# Patient Record
Sex: MALE | Race: WHITE
[De-identification: names, ages, dates, MRNs, and addresses within clinical notes are randomized per-mention and may not be internally consistent; named-entity substitution may affect disease eponyms.]

---

## 2019-06-17 ENCOUNTER — HOSPITAL ENCOUNTER (EMERGENCY)
Dept: HOSPITAL 91 - FTE | Age: 24
LOS: 1 days | Discharge: HOME | End: 2019-06-18
Payer: MEDICAID

## 2019-06-17 ENCOUNTER — HOSPITAL ENCOUNTER (EMERGENCY)
Dept: HOSPITAL 10 - FTE | Age: 24
LOS: 1 days | Discharge: HOME | End: 2019-06-18
Payer: MEDICAID

## 2019-06-17 VITALS
HEIGHT: 66 IN | HEIGHT: 66 IN | WEIGHT: 135.58 LBS | BODY MASS INDEX: 21.79 KG/M2 | WEIGHT: 135.58 LBS | BODY MASS INDEX: 21.79 KG/M2

## 2019-06-17 DIAGNOSIS — R10.32: Primary | ICD-10-CM

## 2019-06-17 LAB
ADD MAN DIFF?: NO
ADD UMIC: NO
ALANINE AMINOTRANSFERASE: 37 IU/L (ref 13–69)
ALBUMIN/GLOBULIN RATIO: 1.32
ALBUMIN: 4.9 G/DL (ref 3.3–4.9)
ALKALINE PHOSPHATASE: 104 IU/L (ref 42–121)
ANION GAP: 10 (ref 5–13)
ASPARTATE AMINO TRANSFERASE: 36 IU/L (ref 15–46)
BASOPHIL #: 0.1 10^3/UL (ref 0–0.1)
BASOPHILS %: 0.5 % (ref 0–2)
BILIRUBIN,DIRECT: 0 MG/DL (ref 0–0.2)
BILIRUBIN,TOTAL: 0.8 MG/DL (ref 0.2–1.3)
BLOOD UREA NITROGEN: 15 MG/DL (ref 7–20)
CALCIUM: 9.6 MG/DL (ref 8.4–10.2)
CARBON DIOXIDE: 29 MMOL/L (ref 21–31)
CHLORIDE: 101 MMOL/L (ref 97–110)
CREATININE: 0.87 MG/DL (ref 0.61–1.24)
EOSINOPHILS #: 0.3 10^3/UL (ref 0–0.5)
EOSINOPHILS %: 2.4 % (ref 0–7)
GLOBULIN: 3.7 G/DL (ref 1.3–3.2)
GLUCOSE: 102 MG/DL (ref 70–220)
HEMATOCRIT: 48.5 % (ref 42–52)
HEMOGLOBIN: 15.9 G/DL (ref 14–18)
IMMATURE GRANS #M: 0.03 10^3/UL (ref 0–0.03)
IMMATURE GRANS % (M): 0.3 % (ref 0–0.43)
LIPASE: 113 U/L (ref 23–300)
LYMPHOCYTES #: 3.8 10^3/UL (ref 0.8–2.9)
LYMPHOCYTES %: 36.7 % (ref 15–51)
MEAN CORPUSCULAR HEMOGLOBIN: 29.8 PG (ref 29–33)
MEAN CORPUSCULAR HGB CONC: 32.8 G/DL (ref 32–37)
MEAN CORPUSCULAR VOLUME: 90.8 FL (ref 82–101)
MEAN PLATELET VOLUME: 10.6 FL (ref 7.4–10.4)
MONOCYTE #: 0.6 10^3/UL (ref 0.3–0.9)
MONOCYTES %: 5.5 % (ref 0–11)
NEUTROPHIL #: 5.7 10^3/UL (ref 1.6–7.5)
NEUTROPHILS %: 54.6 % (ref 39–77)
NUCLEATED RED BLOOD CELLS #: 0 10^3/UL (ref 0–0)
NUCLEATED RED BLOOD CELLS%: 0 /100WBC (ref 0–0)
PLATELET COUNT: 245 10^3/UL (ref 140–415)
POTASSIUM: 4.1 MMOL/L (ref 3.5–5.1)
RED BLOOD COUNT: 5.34 10^6/UL (ref 4.7–6.1)
RED CELL DISTRIBUTION WIDTH: 12.2 % (ref 11.5–14.5)
SODIUM: 140 MMOL/L (ref 135–144)
TOTAL PROTEIN: 8.6 G/DL (ref 6.1–8.1)
UR ASCORBIC ACID: NEGATIVE MG/DL
UR BILIRUBIN (DIP): NEGATIVE MG/DL
UR BLOOD (DIP): NEGATIVE MG/DL
UR CLARITY: CLEAR
UR COLOR: YELLOW
UR GLUCOSE (DIP): NEGATIVE MG/DL
UR KETONES (DIP): (no result) MG/DL
UR LEUKOCYTE ESTERASE (DIP): NEGATIVE LEU/UL
UR NITRITE (DIP): NEGATIVE MG/DL
UR PH (DIP): 7 (ref 5–9)
UR SPECIFIC GRAVITY (DIP): 1.02 (ref 1–1.03)
UR TOTAL PROTEIN (DIP): NEGATIVE MG/DL
UR UROBILINOGEN (DIP): NEGATIVE MG/DL
WHITE BLOOD COUNT: 10.5 10^3/UL (ref 4.8–10.8)

## 2019-06-17 PROCEDURE — 80053 COMPREHEN METABOLIC PANEL: CPT

## 2019-06-17 PROCEDURE — 99285 EMERGENCY DEPT VISIT HI MDM: CPT

## 2019-06-17 PROCEDURE — 83690 ASSAY OF LIPASE: CPT

## 2019-06-17 PROCEDURE — 74177 CT ABD & PELVIS W/CONTRAST: CPT

## 2019-06-17 PROCEDURE — 36415 COLL VENOUS BLD VENIPUNCTURE: CPT

## 2019-06-17 PROCEDURE — 85025 COMPLETE CBC W/AUTO DIFF WBC: CPT

## 2019-06-17 PROCEDURE — 81003 URINALYSIS AUTO W/O SCOPE: CPT

## 2019-06-17 PROCEDURE — 76705 ECHO EXAM OF ABDOMEN: CPT

## 2019-06-17 RX ADMIN — SODIUM CHLORIDE 1 ML: 9 INJECTION, SOLUTION INTRAMUSCULAR; INTRAVENOUS; SUBCUTANEOUS at 23:12

## 2019-06-17 RX ADMIN — THIAMINE HYDROCHLORIDE 1 MLS/HR: 100 INJECTION, SOLUTION INTRAMUSCULAR; INTRAVENOUS at 21:38

## 2019-06-17 RX ADMIN — IOHEXOL 1 ML: 300 INJECTION, SOLUTION INTRAVENOUS at 23:13

## 2019-06-17 RX ADMIN — VASOPRESSIN 1 ML/S: 20 INJECTION, SOLUTION INTRAMUSCULAR; SUBCUTANEOUS at 23:13

## 2019-06-17 NOTE — ERD
ER Documentation


Chief Complaint


Chief Complaint





left lower quadrant pain w/movement denies painful urination or BM





HPI


24-year-old male with presents with a complaint of lower left quadrant abdominal


pain since Saturday morning.  Pain is made worse with movement.  Denies any 


treatments.  Denies any nausea, fevers, chills vomiting, diarrhea, constipation,


dysuria, hematuria.  Denies medical problems.





ROS


All systems reviewed and are negative except as per history of present illness.





Medications


Home Meds


Active Scripts


Hydrocodone/Acetaminophen (Norco 5-325 Tablet) 1 Each Tablet, 1 TAB PO Q6H PRN 


for PAIN, #15 TAB


   Prov:CASANDRA DOVE         19





Allergies


Allergies:  


Coded Allergies:  


     No Known Allergy (Unverified , 19)





FmHx


Family History:  No diabetes, No coronary disease, No other





Physical Exam


Vitals





Vital Signs


  Date      Temp  Pulse  Resp  B/P (MAP)   Pulse Ox  O2          O2 Flow    FiO2


Time                                                 Delivery    Rate


   19  98.0     56    18      116/64        99


     18:23                           (81)





Physical Exam


Const:   No acute distress


Head:   Atraumatic 


Eyes:    Normal Conjunctiva


ENT:    Normal External Ears, Nose and Mouth.


Neck:               Full range of motion. No meningismus.


Resp:   Clear to auscultation bilaterally


Cardio:   Regular rate and rhythm, no murmurs


Abd:    Tenderness palpation lower left quadrant.


Skin:   No petechiae or rashes


Back:   No midline or flank tenderness


Ext:    No cyanosis, or edema


Neur:   Awake and alert


Psych:    Normal Mood and Affect


Result Diagram:  


19





Results 24 hrs





Laboratory Tests


              Test
                                  19
21:31


              White Blood Count                      10.5 10^3/ul


              Red Blood Count                        5.34 10^6/ul


              Hemoglobin                                15.9 g/dl


              Hematocrit                                   48.5 %


              Mean Corpuscular Volume                     90.8 fl


              Mean Corpuscular Hemoglobin                 29.8 pg


              Mean Corpuscular Hemoglobin
Concent      32.8 g/dl 



              Red Cell Distribution Width                  12.2 %


              Platelet Count                          245 10^3/UL


              Mean Platelet Volume                        10.6 fl


              Immature Granulocytes %                     0.300 %


              Neutrophils %                                54.6 %


              Lymphocytes %                                36.7 %


              Monocytes %                                   5.5 %


              Eosinophils %                                 2.4 %


              Basophils %                                   0.5 %


              Nucleated Red Blood Cells %             0.0 /100WBC


              Immature Granulocytes #               0.030 10^3/ul


              Neutrophils #                           5.7 10^3/ul


              Lymphocytes #                           3.8 10^3/ul


              Monocytes #                             0.6 10^3/ul


              Eosinophils #                           0.3 10^3/ul


              Basophils #                             0.1 10^3/ul


              Nucleated Red Blood Cells #             0.0 10^3/ul


              Urine Color                          YELLOW


              Urine Clarity                        CLEAR


              Urine pH                                        7.0


              Urine Specific Gravity                        1.024


              Urine Ketones                        TRACE mg/dL


              Urine Nitrite                        NEGATIVE mg/dL


              Urine Bilirubin                      NEGATIVE mg/dL


              Urine Urobilinogen                   NEGATIVE mg/dL


              Urine Leukocyte Esterase
            NEGATIVE
Cody/ul


              Urine Hemoglobin                     NEGATIVE mg/dL


              Urine Glucose                        NEGATIVE mg/dL


              Urine Total Protein                  NEGATIVE mg/dl


              Sodium Level                             140 mmol/L


              Potassium Level                          4.1 mmol/L


              Chloride Level                           101 mmol/L


              Carbon Dioxide Level                      29 mmol/L


              Anion Gap                                        10


              Blood Urea Nitrogen                        15 mg/dl


              Creatinine                               0.87 mg/dl


              Est Glomerular Filtrat Rate
mL/min   > 60 mL/min 



              Glucose Level                             102 mg/dl


              Calcium Level                             9.6 mg/dl


              Total Bilirubin                           0.8 mg/dl


              Direct Bilirubin                         0.00 mg/dl


              Indirect Bilirubin                        0.8 mg/dl


              Aspartate Amino Transf
(AST/SGOT)          36 IU/L 



              Alanine Aminotransferase
(ALT/SGPT)        37 IU/L 



              Alkaline Phosphatase                       104 IU/L


              Total Protein                              8.6 g/dl


              Albumin                                    4.9 g/dl


              Globulin                                  3.70 g/dl


              Albumin/Globulin Ratio                         1.32


              Lipase                                      113 U/L





Current Medications


 Medications
   Dose
          Sig/Carmelita
       Start Time
   Status  Last


 (Trade)       Ordered        Route
 PRN     Stop Time              Admin
Dose


                              Reason                                Admin


 Sodium         1,000 ml @ 
   Q1H STAT
      19       DC           19


Chloride       1,000 mls/hr   IV
            21:23
                       21:38



                                             19 22:22


 IV Flush
      10 ml          STK-MED        19       DC           19


(NS 10 ml)                    ONCE
 .ROUTE
  :
                       23:12



                                             19 22:32


 Sodium         100 ml @ ud    STK-MED        19       DC           19


Chloride                      ONCE
 .ROUTE
  22:31
                       23:13



                                             19 22:32


 Iohexol
       150 ml         STK-MED        19       DC           19


(Omnipaque                    ONCE
 .ROUTE
  22:31
                       23:13



300mg/
 ml)                                  19 22:32








Procedures/MDM


                            DIAGNOSTIC IMAGING REPORT





Patient: RANDY JAMISON   : 1995   Age: 24  Sex: M                       





       MR #:    [id number]   Acct #:   [id number]    DOS: 19 0127


Ordering MD: CASANDRA DOVE    Location:  [facility]   Room/Bed:                    


                       


                                        


PROCEDURE:   US Abdomen. 


 


CLINICAL INDICATION:   Pain 


 


TECHNIQUE:   Multiple real-time images were acquired of the patient's abdomen 


and retroperitoneum utilizing a high resolution transducer. 


 


COMPARISON:   None 


 


FINDINGS:


No evidence of cholelithiasis gallbladder wall thickening or pericholecystic 


fluid. Common bile duct normal limits in size maximal transverse diameter 3.34 


mm. No intrahepatic biliary ductal dilation. The liver is normal in size maximal


sagittal mention 14.88 cm. Normal liver parenchymal echogenicity without focal 


lesions. The pancreas is unremarkable. Normal portal venous flow. Right kidney 


is normal in size maximal sagittal dimension 11.61 cm. Normal right renal 


parenchymal echogenicity without hydronephrosis intra renal mass or calculus.


 


IMPRESSION:


 


1.  Unremarkable gallbladder without biliary ductal dilation.


2.  Unremarkable liver pancreas and right kidney.


 


RPTAT:AAJJ


_____________________________________________ 


Physician Venecia           Date    Time 


Electronically viewed and signed by Physician Venecia on 2019 02:12 


 


D:  2019 02:12  T:  2019 02:12


BM/





CC: CASANDRA DOVE





[id number]





DIAGNOSTIC IMAGING REPORT





Patient: RANDY JAMISON   : 1995   Age: 24  Sex: M                       





       MR #:    [id number]   Acct #:   [id number]    DOS: 19 2123


Ordering MD: CASANDRA DOVE    Location:  [facility]   Room/Bed:                    


                       


                                        


PROCEDURE:   CT Abdomen and Pelvis with contrast. 


 


CLINICAL INDICATION:  Abdominal pain.


 


TECHNIQUE:   CT scan of the abdomen and pelvis with contrast was performed on a 


multi-detector high-resolution CT scanner.  The patient was scanned following 


the uncomplicated intravenous administration of 90 cc of Omnipaque 300.  Coronal


and sagittal reformatted images were obtained from the axial source images. Im


ages were reviewed on a high-resolution PACS workstation. The total exam CTDI 


equals 5.5 mGy and the total exam DLP equals 324.1 mGy-cm.


 


DICOM images are available.


One or more of the following dose reduction techniques were utilized:


1.) Automated exposure control


2.) Adjustment of the mA +/- kV according to patient's size


3.) Use of iterative reconstruction technique.


 


COMPARISON:   None available. 


 


FINDINGS:


Partially visualized minimal bibasilar pulmonary subsegmental atelectasis, evalu


ation degraded by respiratory motion artifact. Heart base appears normal. 


Systemic vasculature appears grossly normal. Distension of the IVC may reflect 


congestion or be technical related to Valsalva. No pathologic lymphadenopathy 


identified by imaging criteria.


 


Mild hepatomegaly without identifiable focal mass lesion. Portal vein enhances 


normally with nonspecific periportal edema which may reflect hepatitis or 


congestion. Gallbladder is mildly distended with suggestion of mild wall 


thickening which may reflect acute cholecystitis. Pancreas, spleen and adrenal 


glands appear normal.  


 


Bowel is unobstructed without free air or focal mesenteric inflammatory change. 


Appendix appears normal. No identifiable significant diverticulosis nor evidence


of acute diverticulitis. Trace dependent pelvic free fluid may be reactive 


related to enteritis or other inflammatory process.


 


Kidneys perfuse normally with a 2.9 cm simple - appearing right interpolar cyst 


(axial image 68, coronal image 50). The ureters run unobstructed to an 


incompletely distended urinary bladder which is suboptimally evaluated on 


today's exam, with suggestion of moderate diffuse wall thickening which may 


reflect cystitis or neurogenic bladder. Prostate gland appears normal.


 


Osseous structures are intact. Superficial soft tissues and musculature appear 


normal.


 


IMPRESSION:


1. Suggestion of moderate diffuse urinary bladder wall thickening, which may be 


artifactual due to incomplete distension or could reflect cystitis or neurogenic


bladder.


2. No nephrolithiasis nor hydronephrosis. Simple - appearing 2.9 cm right 


interpolar cyst.


3. Mild hepatomegaly with nonspecific periportal edema which may reflect hepat


itis or congestion in the presence of a distended IVC.


4. Suggestion of mild gallbladder wall thickening, which may reflect acute 


cholecystitis. Clinical correlation recommended with consideration for further 


evaluation with right upper quadrant ultrasound.


5. The bowel is unobstructed without evidence of perforation or abscess, and the


appendix appears normal.


6. Trace dependent pelvic free fluid may reflect enteritis or other inflammatory


process.


 


  


 


RPTAT: HSAN


_____________________________________________ 


Nupur Owusu Physician           Date    Time 


Electronically viewed and signed by Nupur Owusu Physician on 2019 


00:16 


 


D:  2019 00:16  T:  2019 00:16


xN/





CC: CASANDRA DOVE





[id number]





MDM: Results of CT were discussed my supervising physician Dr. Sheehan and he 


advised the patient can be discharged with 8-hour follow-up.  Stated the patient


would not need] this time but rather to be discharged with pain medication.   


patient's labs are all within normal limits and there are no acute findings 


shown on CT or ultrasound.  I have low suspicion for acute coronary syndrome, 


AAA, mesenteric ischemia, lower lobe pneumonia, DKA, bowel perforation, 


cholecystitis, choledocholithiasis, ascending cholangitis, hepatic abscess, 


pancreatitis, PUD, splenic rupture, diverticulitis, pyelonephritis, 


nephrolithiasis, appendicitis, [testicular torsion], 





At this time, patient is stable for discharge and outpatient management. I have 


instructed the patient to follow-up with his/her primary care physician in 1-2 


days. I have discussed with the patient the possibility of needing to see a 


specialist for further workup and imaging studies if symptoms persist. I have 


instructed the patient to promptly return to the ER for any new or worsening 


symptoms including but not limited to increased pain, fever, nausea, vomiting, 


weakness or LOC. The patient and/or family expressed understanding of and 


agreement with this plan. All questions were answered. Home care instructions 


were provided. 








DISCLAIMER: 


Inadvertent spelling and grammatical errors are likely due to EHR/dictation 


software use and do not reflect on the overall quality of patient care. Also, 


please note that the electronic time recorded on this note does not necessarily 


reflect the actual time of the patient encounter.





.





Departure


Diagnosis:  


   Primary Impression:  


   Abdominal pain


   Abdominal location:  left lower quadrant  Qualified Codes:  R10.32 - Left 


   lower quadrant pain


Condition:  Stable











LAQUITACASANDRA MONTIEL               2019 21:37

## 2019-06-18 VITALS — DIASTOLIC BLOOD PRESSURE: 60 MMHG | HEART RATE: 72 BPM | RESPIRATION RATE: 16 BRPM | SYSTOLIC BLOOD PRESSURE: 99 MMHG
